# Patient Record
Sex: FEMALE | Race: WHITE | Employment: OTHER | URBAN - METROPOLITAN AREA
[De-identification: names, ages, dates, MRNs, and addresses within clinical notes are randomized per-mention and may not be internally consistent; named-entity substitution may affect disease eponyms.]

---

## 2018-08-06 PROBLEM — I25.10 CORONARY ARTERY DISEASE INVOLVING NATIVE CORONARY ARTERY OF NATIVE HEART, ANGINA PRESENCE UNSPECIFIED: Status: ACTIVE | Noted: 2018-08-06

## 2018-12-07 ENCOUNTER — APPOINTMENT (OUTPATIENT)
Dept: GENERAL RADIOLOGY | Age: 73
End: 2018-12-07
Attending: PHYSICIAN ASSISTANT
Payer: MEDICARE

## 2018-12-07 ENCOUNTER — HOSPITAL ENCOUNTER (EMERGENCY)
Age: 73
Discharge: HOME | End: 2018-12-07
Attending: EMERGENCY MEDICINE
Payer: MEDICARE

## 2018-12-07 VITALS
OXYGEN SATURATION: 99 % | WEIGHT: 115 LBS | BODY MASS INDEX: 19 KG/M2 | RESPIRATION RATE: 18 BRPM | DIASTOLIC BLOOD PRESSURE: 84 MMHG | TEMPERATURE: 99 F | HEART RATE: 95 BPM | SYSTOLIC BLOOD PRESSURE: 147 MMHG

## 2018-12-07 DIAGNOSIS — S52.501A CLOSED FRACTURE OF DISTAL END OF RIGHT RADIUS, UNSPECIFIED FRACTURE MORPHOLOGY, INITIAL ENCOUNTER: Primary | ICD-10-CM

## 2018-12-07 PROCEDURE — 99284 EMERGENCY DEPT VISIT MOD MDM: CPT

## 2018-12-07 PROCEDURE — 73110 X-RAY EXAM OF WRIST: CPT | Performed by: PHYSICIAN ASSISTANT

## 2018-12-07 NOTE — ED PROVIDER NOTES
Patient Seen in: [facility] Emergency Department In [place]    History   Patient presents with:  Upper Extremity Injury (musculoskeletal)    Stated Complaint: wrist injury     47-year-old  female with a history of arthritis coronary artery stenos Temporal   SpO2 99 %   O2 Device None (Room air)       Current:/84   Pulse 95   Temp 98.7 °F (37.1 °C) (Temporal)   Resp 18   Wt 52.2 kg   SpO2 99%   BMI 18.56 kg/m²         Physical Exam   Constitutional: She is oriented to person, place, and time. distally after splint placement.      Patient currently takes tramadol at home for pain control            Disposition and Plan     Clinical Impression:  Closed fracture of distal end of right radius, unspecified fracture morphology, initial encounter  (dom
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
## (undated) NOTE — ED AVS SNAPSHOT
Eva Cuellar   MRN: [id number]    Department:  THE [facility] Emergency Department in [place]   Date of Visit:  12/7/2018           Disclosure     Insurance plans vary and the physician(s) referred by the ER may not be covered by your plan.  Please tell this physician (or your personal doctor if your instructions are to return to your personal doctor) about any new or lasting problems. The primary care or specialist physician will see patients referred from the [facility] Emergency Department.  Salvadore Skiff